# Patient Record
Sex: FEMALE | Race: WHITE | NOT HISPANIC OR LATINO | Employment: UNEMPLOYED | URBAN - NONMETROPOLITAN AREA
[De-identification: names, ages, dates, MRNs, and addresses within clinical notes are randomized per-mention and may not be internally consistent; named-entity substitution may affect disease eponyms.]

---

## 2018-12-11 ENCOUNTER — OFFICE VISIT (OUTPATIENT)
Dept: OBSTETRICS AND GYNECOLOGY | Facility: CLINIC | Age: 43
End: 2018-12-11

## 2018-12-11 VITALS
WEIGHT: 217 LBS | BODY MASS INDEX: 37.05 KG/M2 | HEIGHT: 64 IN | DIASTOLIC BLOOD PRESSURE: 86 MMHG | SYSTOLIC BLOOD PRESSURE: 144 MMHG

## 2018-12-11 DIAGNOSIS — N39.46 URINARY INCONTINENCE, MIXED: ICD-10-CM

## 2018-12-11 DIAGNOSIS — N93.0 POSTCOITAL BLEEDING: ICD-10-CM

## 2018-12-11 DIAGNOSIS — N92.1 MENOMETRORRHAGIA: ICD-10-CM

## 2018-12-11 DIAGNOSIS — N93.9 ABNORMAL UTERINE BLEEDING (AUB): Primary | ICD-10-CM

## 2018-12-11 DIAGNOSIS — Z12.4 SCREENING FOR MALIGNANT NEOPLASM OF CERVIX: ICD-10-CM

## 2018-12-11 PROCEDURE — 58100 BIOPSY OF UTERUS LINING: CPT | Performed by: OBSTETRICS & GYNECOLOGY

## 2018-12-11 PROCEDURE — 99204 OFFICE O/P NEW MOD 45 MIN: CPT | Performed by: OBSTETRICS & GYNECOLOGY

## 2018-12-11 RX ORDER — IBUPROFEN 800 MG/1
800 TABLET ORAL EVERY 6 HOURS PRN
Status: DISCONTINUED | OUTPATIENT
Start: 2018-12-11 | End: 2019-03-30 | Stop reason: HOSPADM

## 2018-12-11 RX ADMIN — IBUPROFEN 800 MG: 800 TABLET ORAL at 16:11

## 2018-12-11 NOTE — PROGRESS NOTES
Subjective   Chief Complaint   Patient presents with   • Menorrhagia     C/O bleeding heavy with clots 2-3 weeks every month and after intercourse x 3 months     Spike Lewis is a 43 y.o. year old  presenting to be seen for heavy and irregular vaginal bleeding and urinary symptoms.    She reports a long-standing history of heavy and irregular vaginal bleeding.  For many years, she experienced heavy and painful periods.  At roughly age 41, her periods stopped abruptly.  She had no vaginal bleeding for 2 years.  This year, her bleeding started again, but is now much heavier and more painful.  She denies hot flashes or night sweats over the past several years.  During several months of this year, she is experienced multiple bleeding episodes in the same month.  During the month of November, she bled almost every day.  She will frequently bleed after intercourse. She has never sought medical attention for these issues.      She has no history of hormonal medication.  She did undergo a tubal ligation after the delivery of her last child.     She reports significant urinary symptoms including urgency and incontinence.  She reports leakage of urine with both urgency symptoms and cough/sneeze/laugh.  She leaks urine most days and frequently must wear a pad.  She will typically change pads twice per day.  She frequently soils clothing with urine and blood.    Pap smear: 2005 - normal per patient report, denies history of abnormal Pap smears  Mammogram: never  Colonoscopy: never  DEXA Scan: never    She denies nausea, emesis, fevers, chills, significant weight changes, hair/skin/nail changes, dysuria, urinary frequency, palpitations, chest pain, headaches, myalgia, dyspnea.     History  Past Medical History:   Diagnosis Date   • Disease of thyroid gland    • Lupus    , Past Surgical History:   Procedure Laterality Date   •  SECTION     • CHOLECYSTECTOMY     , History reviewed. No pertinent family history.,  "Social History     Tobacco Use   • Smoking status: Current Every Day Smoker     Packs/day: 1.00     Years: 0.00     Pack years: 0.00   • Smokeless tobacco: Never Used   Substance Use Topics   • Alcohol use: Not on file   • Drug use: Not on file   ,   (Not in a hospital admission) and Allergies:  Patient has no known allergies.    No current outpatient medications on file prior to visit.     No current facility-administered medications on file prior to visit.        Social History    Tobacco Use      Smoking status: Current Every Day Smoker        Packs/day: 1.00        Years: 0.00        Pack years: 0      Smokeless tobacco: Never Used      Review of Systems  Pertinent items are noted in HPI, all other systems were reviewed and negative       Objective   /86   Ht 162.6 cm (64\")   Wt 98.4 kg (217 lb)   LMP 11/20/2018   BMI 37.25 kg/m²     Physical Exam:  General Appearance: alert, pleasant, appears stated age, interactive and cooperative  Head: normocephalic, without obvious abnormality and atraumatic  Eyes: lids and lashes normal and no icterus  Ears: ears appear intact with no abnormalities noted  Nose: nares normal, septum midline, mucosa normal and no drainage  Neck: suppple, trachea midline and no thyromegaly  Back: no kyphosis present, no scoliosis present and range of motion normal  Lungs: respirations regular, respirations even and respirations unlabored, clear to auscultation bilaterally   Heart: regular rhythm and normal rate, normal S1, S2, no murmur, gallop, or rubs and no click  Breasts: Not performed.  Abdomen: no masses, no hepatomegaly, no splenomegaly, soft non-tender, no guarding and no rebound tenderness  Extremities: moves extremities well, no edema, no cyanosis and no redness  Skin: no bleeding, bruising or rash and no lesions noted  Lymph Nodes: no palpable adenopathy  Neurologic: Cranial Nerves cranial nerves 2 - 12 grossly intact, Speech normal content and execusion, Coordination " normal  Psych: normal mood and affect, oriented to person, time and place, thought content organized and appropriate judgment    Pelvis:  Pelvic: Clinical staff was present for exam  External genitalia:  normal appearance of the external genitalia including Bartholin's and [place]'s glands.  :  urethral meatus normal;  Vagina:  normal pink mucosa without prolapse or lesions.  Cervix:  normal appearance.  Uterus:  normal size, shape and consistency.  Adnexa:  normal bimanual exam of the adnexa.  Rectal:  digital rectal exam not performed; anus visually normal appearing.    Review of Labs:   No data reviewed    Review of Imaging:  No data reviewed    Decision to obtain old records:  No.    Summarization of old records:  N/A    Independent review of image, tracing or specimen:  N/A       Assessment   1.  Abnormal uterine bleeding  2.  Menometrorrhagia  3.  Dysmenorrhea  4.  Mixed urinary incontinence  5.  Post-coital bleeding  6.  Age-appropriate health screening  7.  Tobacco abuse     Plan    Orders Placed This Encounter   Procedures   • US Pelvis Complete     Standing Status:   Future     Standing Expiration Date:   12/11/2019     Order Specific Question:   Reason for Exam:     Answer:   AUB   • Estradiol   • Follicle Stimulating Hormone   • Hemoglobin A1c   • Thyroid Panel With TSH   • Progesterone   • Luteinizing Hormone   • HCG, B-subunit, Quantitative   • Prolactin   Urodynamic testing - Mixed urinary incontinence    Medications ordered: none    Procedures performed: Pap smear, Endometrial biopsy    Endometrial Biopsy    Date of procedure: 12/11/2018    Indication:  Abnormal Uterine Bleeding             Informed Consent Obtained    Procedure documentation:    The patient was placed in the dorsal lithotomy position.  A speculum was placed in the vagina.  The cervix was prepped. The cervix was grasped anterior at the 12 o'clock position.  The cavity sounded to 9 centimeters.  An endometrial biopsy specimen was  obtained with multiple passes.  The tissue was sent for permanent histopathologic evaluation.  Tenaculum was removed from the cervix with scant bleeding.  The patient tolerated the procedure without any complications.    Post procedure instructions: She was instructed to call the office in 1 week if she has not heard from us otherwise.  If there is any significant fever, cramping, malodorous discharge, or heavy bleeding she is to call the office immediately or go to the ER.    We reviewed her symptoms in detail today.  Additonial work-up is planned as listed above.  We discussed possible treatment options focusing on the Mirena IUD, endometrial ablation and hysterectomy.  She was given handouts to further consider these options.  We discussed the possibility of a mid urethral sling and/or medical therapy for urge incontinence.    She will return for urodynamic testing and a pelvic ultrasound.    Follow up in 2 weeks after ultrasound    Clyde Guadarrama MD  Obstetrics and Gynecology  [facility]

## 2018-12-12 LAB
ESTRADIOL SERPL-MCNC: 165.8 PG/ML
FSH SERPL-ACNC: 3 MIU/ML
FT4I SERPL CALC-MCNC: 2 (ref 1.2–4.9)
HBA1C MFR BLD: 5.8 %
HCG INTACT+B SERPL-ACNC: <2.39 MIU/ML
LH SERPL-ACNC: 2.6 MIU/ML
PROGEST SERPL-MCNC: 9.2 NG/ML
PROLACTIN SERPL-MCNC: 5.7 NG/ML (ref 4.8–23.3)
T3RU NFR SERPL: 21 % (ref 24–39)
T4 SERPL-MCNC: 9.3 UG/DL (ref 4.5–12)
TSH SERPL DL<=0.005 MIU/L-ACNC: 1.48 UIU/ML (ref 0.45–4.5)

## 2018-12-18 DIAGNOSIS — N92.1 MENOMETRORRHAGIA: ICD-10-CM

## 2018-12-18 DIAGNOSIS — N93.0 POSTCOITAL BLEEDING: ICD-10-CM

## 2018-12-18 DIAGNOSIS — N93.9 ABNORMAL UTERINE BLEEDING (AUB): ICD-10-CM

## 2018-12-18 DIAGNOSIS — Z12.4 SCREENING FOR MALIGNANT NEOPLASM OF CERVIX: ICD-10-CM

## 2019-01-11 ENCOUNTER — PREP FOR SURGERY (OUTPATIENT)
Dept: OTHER | Facility: HOSPITAL | Age: 44
End: 2019-01-11

## 2019-01-11 ENCOUNTER — CLINICAL SUPPORT (OUTPATIENT)
Dept: OBSTETRICS AND GYNECOLOGY | Facility: CLINIC | Age: 44
End: 2019-01-11

## 2019-01-11 ENCOUNTER — APPOINTMENT (OUTPATIENT)
Dept: PREADMISSION TESTING | Facility: HOSPITAL | Age: 44
End: 2019-01-11

## 2019-01-11 ENCOUNTER — OFFICE VISIT (OUTPATIENT)
Dept: OBSTETRICS AND GYNECOLOGY | Facility: CLINIC | Age: 44
End: 2019-01-11

## 2019-01-11 ENCOUNTER — HOSPITAL ENCOUNTER (OUTPATIENT)
Dept: GENERAL RADIOLOGY | Facility: HOSPITAL | Age: 44
Discharge: HOME | End: 2019-01-11
Admitting: OBSTETRICS & GYNECOLOGY

## 2019-01-11 VITALS
DIASTOLIC BLOOD PRESSURE: 86 MMHG | SYSTOLIC BLOOD PRESSURE: 144 MMHG | BODY MASS INDEX: 37.05 KG/M2 | WEIGHT: 217 LBS | HEIGHT: 64 IN

## 2019-01-11 VITALS
SYSTOLIC BLOOD PRESSURE: 136 MMHG | BODY MASS INDEX: 36.7 KG/M2 | WEIGHT: 215 LBS | HEIGHT: 64 IN | DIASTOLIC BLOOD PRESSURE: 74 MMHG

## 2019-01-11 VITALS
BODY MASS INDEX: 36.7 KG/M2 | HEART RATE: 82 BPM | SYSTOLIC BLOOD PRESSURE: 163 MMHG | HEIGHT: 64 IN | DIASTOLIC BLOOD PRESSURE: 89 MMHG | WEIGHT: 215 LBS

## 2019-01-11 DIAGNOSIS — N93.9 ABNORMAL UTERINE BLEEDING (AUB): Primary | ICD-10-CM

## 2019-01-11 DIAGNOSIS — N39.3 STRESS INCONTINENCE IN FEMALE: ICD-10-CM

## 2019-01-11 DIAGNOSIS — E66.9 OBESITY (BMI 30-39.9): ICD-10-CM

## 2019-01-11 DIAGNOSIS — N93.9 ABNORMAL UTERINE BLEEDING (AUB): ICD-10-CM

## 2019-01-11 DIAGNOSIS — N92.1 MENOMETRORRHAGIA: ICD-10-CM

## 2019-01-11 DIAGNOSIS — F17.200 SMOKER: ICD-10-CM

## 2019-01-11 DIAGNOSIS — R73.03 PREDIABETES: ICD-10-CM

## 2019-01-11 DIAGNOSIS — N39.46 MIXED STRESS AND URGE URINARY INCONTINENCE: Primary | ICD-10-CM

## 2019-01-11 LAB
ABO GROUP BLD: NORMAL
ANION GAP SERPL CALCULATED.3IONS-SCNC: 12.9 MMOL/L (ref 10–20)
BASOPHILS # BLD AUTO: 0.05 10*3/MM3 (ref 0–0.2)
BASOPHILS NFR BLD AUTO: 0.6 % (ref 0–2.5)
BILIRUB UR QL STRIP: NEGATIVE
BUN BLD-MCNC: 10 MG/DL (ref 7–20)
BUN/CREAT SERPL: 14.3 (ref 7.1–23.5)
CALCIUM SPEC-SCNC: 9.7 MG/DL (ref 8.4–10.2)
CHLORIDE SERPL-SCNC: 105 MMOL/L (ref 98–107)
CLARITY UR: CLEAR
CO2 SERPL-SCNC: 23 MMOL/L (ref 26–30)
COLOR UR: YELLOW
CREAT BLD-MCNC: 0.7 MG/DL (ref 0.6–1.3)
DEPRECATED RDW RBC AUTO: 46.9 FL (ref 37–54)
EOSINOPHIL # BLD AUTO: 0.16 10*3/MM3 (ref 0–0.7)
EOSINOPHIL NFR BLD AUTO: 1.8 % (ref 0–7)
ERYTHROCYTE [DISTWIDTH] IN BLOOD BY AUTOMATED COUNT: 14.6 % (ref 11.5–14.5)
GFR SERPL CREATININE-BSD FRML MDRD: 91 ML/MIN/1.73
GLUCOSE BLD-MCNC: 88 MG/DL (ref 74–98)
GLUCOSE UR STRIP-MCNC: NEGATIVE MG/DL
HCT VFR BLD AUTO: 40.4 % (ref 37–47)
HGB BLD-MCNC: 13.7 G/DL (ref 12–16)
HGB UR QL STRIP.AUTO: NEGATIVE
IMM GRANULOCYTES # BLD AUTO: 0.02 10*3/MM3 (ref 0–0.06)
IMM GRANULOCYTES NFR BLD AUTO: 0.2 % (ref 0–0.6)
KETONES UR QL STRIP: NEGATIVE
LEUKOCYTE ESTERASE UR QL STRIP.AUTO: NEGATIVE
LYMPHOCYTES # BLD AUTO: 3.57 10*3/MM3 (ref 0.6–3.4)
LYMPHOCYTES NFR BLD AUTO: 41.1 % (ref 10–50)
MCH RBC QN AUTO: 29.6 PG (ref 27–31)
MCHC RBC AUTO-ENTMCNC: 33.9 G/DL (ref 30–37)
MCV RBC AUTO: 87.3 FL (ref 81–99)
MONOCYTES # BLD AUTO: 0.46 10*3/MM3 (ref 0–0.9)
MONOCYTES NFR BLD AUTO: 5.3 % (ref 0–12)
NEUTROPHILS # BLD AUTO: 4.43 10*3/MM3 (ref 2–6.9)
NEUTROPHILS NFR BLD AUTO: 51 % (ref 37–80)
NITRITE UR QL STRIP: NEGATIVE
NRBC BLD AUTO-RTO: 0 /100 WBC (ref 0–0)
PH UR STRIP.AUTO: 5.5 [PH] (ref 5–8)
PLATELET # BLD AUTO: 340 10*3/MM3 (ref 130–400)
PMV BLD AUTO: 9.9 FL (ref 6–12)
POTASSIUM BLD-SCNC: 3.9 MMOL/L (ref 3.5–5.1)
PROT UR QL STRIP: NEGATIVE
RBC # BLD AUTO: 4.63 10*6/MM3 (ref 4.2–5.4)
RH BLD: POSITIVE
SODIUM BLD-SCNC: 137 MMOL/L (ref 137–145)
SP GR UR STRIP: 1.02 (ref 1–1.03)
UROBILINOGEN UR QL STRIP: NORMAL
WBC NRBC COR # BLD: 8.69 10*3/MM3 (ref 4.8–10.8)

## 2019-01-11 PROCEDURE — 81003 URINALYSIS AUTO W/O SCOPE: CPT | Performed by: OBSTETRICS & GYNECOLOGY

## 2019-01-11 PROCEDURE — 51729 CYSTOMETROGRAM W/VP&UP: CPT | Performed by: OBSTETRICS & GYNECOLOGY

## 2019-01-11 PROCEDURE — 80048 BASIC METABOLIC PNL TOTAL CA: CPT | Performed by: OBSTETRICS & GYNECOLOGY

## 2019-01-11 PROCEDURE — 93005 ELECTROCARDIOGRAM TRACING: CPT | Performed by: OBSTETRICS & GYNECOLOGY

## 2019-01-11 PROCEDURE — 36415 COLL VENOUS BLD VENIPUNCTURE: CPT

## 2019-01-11 PROCEDURE — 51741 ELECTRO-UROFLOWMETRY FIRST: CPT | Performed by: OBSTETRICS & GYNECOLOGY

## 2019-01-11 PROCEDURE — 71045 X-RAY EXAM CHEST 1 VIEW: CPT

## 2019-01-11 PROCEDURE — 86900 BLOOD TYPING SEROLOGIC ABO: CPT | Performed by: OBSTETRICS & GYNECOLOGY

## 2019-01-11 PROCEDURE — 99214 OFFICE O/P EST MOD 30 MIN: CPT | Performed by: OBSTETRICS & GYNECOLOGY

## 2019-01-11 PROCEDURE — 86901 BLOOD TYPING SEROLOGIC RH(D): CPT | Performed by: OBSTETRICS & GYNECOLOGY

## 2019-01-11 PROCEDURE — 51797 INTRAABDOMINAL PRESSURE TEST: CPT | Performed by: OBSTETRICS & GYNECOLOGY

## 2019-01-11 PROCEDURE — 85025 COMPLETE CBC W/AUTO DIFF WBC: CPT | Performed by: OBSTETRICS & GYNECOLOGY

## 2019-01-11 RX ORDER — SULFAMETHOXAZOLE AND TRIMETHOPRIM 800; 160 MG/1; MG/1
TABLET ORAL
COMMUNITY
Start: 2018-12-04 | End: 2019-01-11

## 2019-01-11 RX ORDER — SODIUM CHLORIDE 0.9 % (FLUSH) 0.9 %
3 SYRINGE (ML) INJECTION EVERY 12 HOURS SCHEDULED
Status: CANCELLED | OUTPATIENT
Start: 2019-03-29

## 2019-01-11 RX ORDER — CEFAZOLIN SODIUM 2 G/50ML
2 SOLUTION INTRAVENOUS
Status: CANCELLED | OUTPATIENT
Start: 2019-01-11

## 2019-01-11 RX ORDER — PHENAZOPYRIDINE HYDROCHLORIDE 100 MG/1
200 TABLET, FILM COATED ORAL ONCE
Status: CANCELLED | OUTPATIENT
Start: 2019-03-29 | End: 2019-01-11

## 2019-01-11 RX ORDER — CEPHALEXIN 500 MG/1
CAPSULE ORAL
COMMUNITY
Start: 2019-01-07 | End: 2019-01-11

## 2019-01-11 RX ORDER — IBUPROFEN 800 MG/1
TABLET ORAL
COMMUNITY
Start: 2018-12-04 | End: 2019-01-11

## 2019-01-11 RX ORDER — SODIUM CHLORIDE 0.9 % (FLUSH) 0.9 %
1-10 SYRINGE (ML) INJECTION AS NEEDED
Status: CANCELLED | OUTPATIENT
Start: 2019-03-29

## 2019-01-11 RX ORDER — RANITIDINE 300 MG/1
300 TABLET ORAL 2 TIMES DAILY
COMMUNITY
Start: 2019-01-07

## 2019-01-11 NOTE — PROGRESS NOTES
Subjective   Chief Complaint   Patient presents with   • Follow-up     Patient had Urodynamics and TVS done today for AUB     Spike Lewis is a 43 y.o. year old  presenting to be seen for follow-up AUB and urinary incontinence.    Patient was last seen on  where she reported a long-standing history of heavy and irregular vaginal bleeding.  For many years, she experienced heavy and painful periods.  At roughly age 41, her periods stopped abruptly.  She had no vaginal bleeding for 2 years. In 2018, her bleeding started again, but is now much heavier and more painful.  She denies hot flashes or night sweats over the past several years.  During several months of last year, she experienced multiple bleeding episodes in the same month.  During the month of November, she bled almost every day.  She will frequently bleed after intercourse.       She has no history of hormonal medication.  She did undergo a tubal ligation after the delivery of her last child.  She has undergone 2 previous C-sections.    Her blood work from her last visit was reassuring except for an elevated A1c consistent with prediabetes.  Her Pap smear and endometrial biopsy from that visit were both benign.  She underwent a pelvic ultrasound today.     At her visit on  she also reported significant urinary symptoms including urgency and incontinence.  She reports leakage of urine with both urgency symptoms and cough/sneeze/laugh.  She leaks urine most days and frequently must wear a pad.  She will typically change pads twice per day.  She frequently soils clothing with urine and blood.  She confirms today that her stress symptoms are more bothersome than the urgency symptoms.    She denies nausea, emesis, fevers, chills, significant weight changes, hair/skin/nail changes, palpitations, chest pain, headaches, myalgia, dyspnea.     History  Past Medical History:   Diagnosis Date   • Body piercing     BOTH EARS   • Disease of thyroid  "gland    • Full dentures    • GERD (gastroesophageal reflux disease)    • Stress incontinence    • Wears glasses    , Past Surgical History:   Procedure Laterality Date   •  SECTION      X2   • CHOLECYSTECTOMY     • MOUTH SURGERY      FULL EXTRACTION   , No family history on file., Social History     Tobacco Use   • Smoking status: Current Every Day Smoker     Packs/day: 1.00     Years: 26.00     Pack years: 26.00   • Smokeless tobacco: Never Used   Substance Use Topics   • Alcohol use: No     Frequency: Never   • Drug use: Yes     Types: Methamphetamines     Comment: 22 MONTHS SOBER   ,   (Not in a hospital admission) and Allergies:  Penicillins    Current Outpatient Medications on File Prior to Visit   Medication Sig Dispense Refill   • raNITIdine (ZANTAC) 300 MG tablet Take 300 mg by mouth 2 (Two) Times a Day.     • [DISCONTINUED] cephalexin (KEFLEX) 500 MG capsule      • [DISCONTINUED] ibuprofen (ADVIL,MOTRIN) 800 MG tablet      • [DISCONTINUED] mupirocin (BACTROBAN) 2 % ointment      • [DISCONTINUED] sulfamethoxazole-trimethoprim (BACTRIM DS,SEPTRA DS) 800-160 MG per tablet        Current Facility-Administered Medications on File Prior to Visit   Medication Dose Route Frequency Provider Last Rate Last Dose   • ibuprofen (ADVIL,MOTRIN) tablet 800 mg  800 mg Oral Q6H PRN Clyde Guadarrama MD   800 mg at 18 1611       Social History    Tobacco Use      Smoking status: Current Every Day Smoker        Packs/day: 1.00        Years: 26.00        Pack years: 26      Smokeless tobacco: Never Used      Review of Systems  Pertinent items are noted in HPI, all other systems were reviewed and negative       Objective   /74   Ht 162.6 cm (64\")   Wt 97.5 kg (215 lb)   LMP 2018   BMI 36.90 kg/m²     Physical Exam:  General Appearance: alert, pleasant, appears stated age, interactive and cooperative  Head: normocephalic, without obvious abnormality and atraumatic  Eyes: lids and lashes normal " and no icterus  Ears: ears appear intact with no abnormalities noted  Nose: nares normal, septum midline, mucosa normal and no drainage  Neck: suppple, trachea midline and no thyromegaly  Back: no kyphosis present, no scoliosis present and range of motion normal  Lungs: respirations regular, respirations even and respirations unlabored, clear to auscultation bilaterally   Heart: regular rhythm and normal rate, normal S1, S2, no murmur, gallop, or rubs and no click  Breasts: Not performed.  Abdomen: no masses, no hepatomegaly, no splenomegaly, soft non-tender, no guarding and no rebound tenderness  Extremities: moves extremities well, no edema, no cyanosis and no redness  Skin: no bleeding, bruising or rash and no lesions noted  Lymph Nodes: no palpable adenopathy  Neurologic: Cranial Nerves cranial nerves 2 - 12 grossly intact, Speech normal content and execusion, Coordination normal  Psych: normal mood and affect, oriented to person, time and place, thought content organized and appropriate judgment    Review of Labs:   All lab work from her visit on 12/11  Pap smear and EMB with benign results 12/11    Review of Imaging:  No data reviewed    Decision to obtain old records:  No.    Summarization of old records:  N/A    Independent review of image, tracing or specimen:  A pelvic ultrasound was ordered and independently reviewed today. Normal sized, anteverted uterus with multiple small exophytic fibroids, the largest of which measures 1.5 cm.  Endometrium measures 9 mm.  Both ovaries are normal in appearance with normal vascularity.  No adnexal masses.  No free fluid in the cul-de-sac.    Her urodynamic testing today confirms urodynamic stress incontinence. No evidence of intrinsic sphincter deficiency.       Assessment   Abnormal uterine bleeding  Menometrorrhagia  Dysmenorrhea  Postcoital bleeding  Mixed urinary incontinence with stress predominance and urodynamic stress incontinence, no ISD  Tobacco  abuse  Obesity - BMI 37  Prediabetes - A1C 5.8%     Plan    We reviewed her symptoms, labs, previous workup, ultrasound findings and urodynamic testing in detail today.  Her bleeding and pain symptoms are debilitating and affect daily activities.  She does soil clothing with her bleeding.  We reviewed treatment options including the Mirena IUD, other medical therapies, endometrial ablation and hysterectomy.  The patient desires definitive treatment and would like to move forward with hysterectomy.  We discussed possible surgical routes reviewing risks and benefits of each.  We will plan for a total laparoscopic hysterectomy with bilateral salpingectomy.    Her urinary symptoms are likewise very bothersome to the patient and she does demonstrate genuine stress incontinence on urodynamics today.  We reviewed treatment options with mid urethral sling placement being the gold standard.  The patient would like concomitant sling placement at the time of hysterectomy. We will plan for a TOT sling + Cysto.  We discussed the risks of slings including, specifically, worsening urgency symptoms.  She would likely benefit from anti-cholinergic therapy moving forward.    We discussed smoking cessation and diet modification.  I reviewed the implications of prediabetes with the patient today.    Clyde Guadarrama MD  Obstetrics and Gynecology  [facility]

## 2019-01-11 NOTE — PAT
CALLED TRI MI CRNA R/T CXR ORDERED PER ANESTHESIA ORDER SET.  PT C/O BRONCHITIS  ABOUT A MONTH AGO.  STATES THAT SHE WAS TREATED WITH TWO DIFFERENT ANTIBIOTICS, IN WHICH SHE HAS COMPLETED.  PT DENIES EVER HAVING A CHEST X-RAY.  PT STATES THAT SHE STILL HAS A DRY COUGH.  DENIES ANY FEVER OR NASAL CONGESTION.

## 2019-01-15 PROBLEM — F17.200 SMOKER: Status: ACTIVE | Noted: 2019-01-15

## 2019-01-15 PROBLEM — E66.9 OBESITY (BMI 30-39.9): Status: ACTIVE | Noted: 2019-01-15

## 2019-01-15 PROBLEM — R73.03 PREDIABETES: Status: ACTIVE | Noted: 2019-01-15

## 2019-01-31 ENCOUNTER — ANESTHESIA EVENT (OUTPATIENT)
Dept: PERIOP | Facility: HOSPITAL | Age: 44
End: 2019-01-31

## 2019-01-31 ENCOUNTER — ANESTHESIA (OUTPATIENT)
Dept: PERIOP | Facility: HOSPITAL | Age: 44
End: 2019-01-31

## 2019-03-15 ENCOUNTER — APPOINTMENT (OUTPATIENT)
Dept: PREADMISSION TESTING | Facility: HOSPITAL | Age: 44
End: 2019-03-15

## 2019-03-22 ENCOUNTER — HOSPITAL ENCOUNTER (OUTPATIENT)
Dept: GENERAL RADIOLOGY | Facility: HOSPITAL | Age: 44
Discharge: HOME | End: 2019-03-22
Admitting: OBSTETRICS & GYNECOLOGY

## 2019-03-22 ENCOUNTER — APPOINTMENT (OUTPATIENT)
Dept: PREADMISSION TESTING | Facility: HOSPITAL | Age: 44
End: 2019-03-22

## 2019-03-22 VITALS — BODY MASS INDEX: 37.22 KG/M2 | HEIGHT: 64 IN | WEIGHT: 218 LBS

## 2019-03-22 LAB
BILIRUB UR QL STRIP: NEGATIVE
CLARITY UR: CLEAR
COLOR UR: YELLOW
DEPRECATED RDW RBC AUTO: 44.3 FL (ref 37–54)
ERYTHROCYTE [DISTWIDTH] IN BLOOD BY AUTOMATED COUNT: 13.7 % (ref 11.5–14.5)
GLUCOSE UR STRIP-MCNC: NEGATIVE MG/DL
HCT VFR BLD AUTO: 44.4 % (ref 37–47)
HGB BLD-MCNC: 14.7 G/DL (ref 12–16)
HGB UR QL STRIP.AUTO: NEGATIVE
KETONES UR QL STRIP: NEGATIVE
LEUKOCYTE ESTERASE UR QL STRIP.AUTO: NEGATIVE
MCH RBC QN AUTO: 29.5 PG (ref 27–31)
MCHC RBC AUTO-ENTMCNC: 33.1 G/DL (ref 30–37)
MCV RBC AUTO: 89 FL (ref 81–99)
NITRITE UR QL STRIP: NEGATIVE
PH UR STRIP.AUTO: 7 [PH] (ref 5–8)
PLATELET # BLD AUTO: 386 10*3/MM3 (ref 130–400)
PMV BLD AUTO: 9.7 FL (ref 6–12)
PROT UR QL STRIP: NEGATIVE
RBC # BLD AUTO: 4.99 10*6/MM3 (ref 4.2–5.4)
SP GR UR STRIP: 1.01 (ref 1–1.03)
UROBILINOGEN UR QL STRIP: NORMAL
WBC NRBC COR # BLD: 12.13 10*3/MM3 (ref 4.8–10.8)

## 2019-03-22 PROCEDURE — 36415 COLL VENOUS BLD VENIPUNCTURE: CPT

## 2019-03-22 PROCEDURE — 85027 COMPLETE CBC AUTOMATED: CPT | Performed by: OBSTETRICS & GYNECOLOGY

## 2019-03-22 PROCEDURE — 71045 X-RAY EXAM CHEST 1 VIEW: CPT

## 2019-03-22 PROCEDURE — 81003 URINALYSIS AUTO W/O SCOPE: CPT | Performed by: OBSTETRICS & GYNECOLOGY

## 2019-03-22 NOTE — DISCHARGE INSTRUCTIONS
Patient instructed on PAT PASS information.  Patient/family member verbalized understanding of instruction/education.Patient to apply Chlorhexadine wipes  to surgical area (as instructed) the night before procedure and the AM of procedure. Wipes provided.Chlorhexidine wipes given to patient with verification sheet. Patient/Family member verbalized understanding to all teaching and instruction.GYNECOLOGICAL PREP:    1). Soap Suds Enema (as instructed) the night before surgery.    2). Purchase two medicated douches. Use one douche the night before the procedure     and the other on the morning of your surgery.    Schedule:    On______, the evening before the procedure, do the following:    Soap Suds Enema  1 Medicated Douche  Shower  Use 1 package of chlorhexidine wipes, 1 hour after the shower.      On______, the morning of the procedure, do the followin Medicated Douche  Use 1 package of chlorhexidine wipes.PAT PASS GIVEN/REVIEWED WITH PT.  VERBALIZED UNDERSTANDING OF THE FOLLOWING:  DO NOT EAT, DRINK, SMOKE, USE SMOKELESS TOBACCO OR CHEW GUM AFTER MIDNIGHT THE NIGHT BEFORE SURGERY.  THIS ALSO INCLUDES HARD CANDIES AND MINTS.    DO NOT SHAVE THE AREA TO BE OPERATED ON AT LEAST 48 HOURS PRIOR TO THE PROCEDURE.  DO NOT WEAR MAKE UP OR NAIL POLISH.  DO NOT LEAVE IN ANY PIERCING OR WEAR JEWELRY THE DAY OF SURGERY.      DO NOT USE ADHESIVES IF YOU WEAR DENTURES.    DO NOT WEAR EYE CONTACTS; BRING IN YOUR GLASSES.    ONLY TAKE MEDICATION THE MORNING OF YOUR PROCEDURE IF INSTRUCTED BY YOUR SURGEON WITH ENOUGH WATER TO SWALLOW THE MEDICATION.  IF YOUR SURGEON DID NOT SPECIFY WHICH MEDICATIONS TO TAKE, YOU WILL NEED TO CALL THEIR OFFICE FOR FURTHER INSTRUCTIONS AND DO AS THEY INSTRUCT.    LEAVE ANYTHING YOU CONSIDER VALUABLE AT HOME.    YOU WILL NEED TO ARRANGE FOR SOMEONE TO DRIVE YOU HOME AFTER SURGERY.  IT IS RECOMMENDED THAT YOU DO NOT DRIVE, WORK, DRINK ALCOHOL OR MAKE MAJOR DECISIONS FOR AT LEAST 24 HOURS AFTER  YOUR PROCEDURE IS COMPLETE.      THE DAY OF YOUR PROCEDURE, BRING IN THE FOLLOWING IF APPLICABLE:   PICTURE ID AND INSURANCE/MEDICARE OR MEDICAID CARDS   COPY OF ADVANCED DIRECTIVE/LIVING WILL/POWER OR    CPAP/BIPAP/INHALERS   SKIN PREP SHEET   YOUR PREADMISSION TESTING PASS (IF NOT A PHONE HISTORY)        PAT PASS GIVEN/REVIEWED WITH PT.  VERBALIZED UNDERSTANDING OF THE FOLLOWING:  DO NOT EAT, DRINK, SMOKE, USE SMOKELESS TOBACCO OR CHEW GUM AFTER MIDNIGHT THE NIGHT BEFORE SURGERY.  THIS ALSO INCLUDES HARD CANDIES AND MINTS.    DO NOT SHAVE THE AREA TO BE OPERATED ON AT LEAST 48 HOURS PRIOR TO THE PROCEDURE.  DO NOT WEAR MAKE UP OR NAIL POLISH.  DO NOT LEAVE IN ANY PIERCING OR WEAR JEWELRY THE DAY OF SURGERY.      DO NOT USE ADHESIVES IF YOU WEAR DENTURES.    DO NOT WEAR EYE CONTACTS; BRING IN YOUR GLASSES.    ONLY TAKE MEDICATION THE MORNING OF YOUR PROCEDURE IF INSTRUCTED BY YOUR SURGEON WITH ENOUGH WATER TO SWALLOW THE MEDICATION.  IF YOUR SURGEON DID NOT SPECIFY WHICH MEDICATIONS TO TAKE, YOU WILL NEED TO CALL THEIR OFFICE FOR FURTHER INSTRUCTIONS AND DO AS THEY INSTRUCT.    LEAVE ANYTHING YOU CONSIDER VALUABLE AT HOME.    YOU WILL NEED TO ARRANGE FOR SOMEONE TO DRIVE YOU HOME AFTER SURGERY.  IT IS RECOMMENDED THAT YOU DO NOT DRIVE, WORK, DRINK ALCOHOL OR MAKE MAJOR DECISIONS FOR AT LEAST 24 HOURS AFTER YOUR PROCEDURE IS COMPLETE.      THE DAY OF YOUR PROCEDURE, BRING IN THE FOLLOWING IF APPLICABLE:   PICTURE ID AND INSURANCE/MEDICARE OR MEDICAID CARDS   COPY OF ADVANCED DIRECTIVE/LIVING WILL/POWER OR    CPAP/BIPAP/INHALERS   SKIN PREP SHEET   YOUR PREADMISSION TESTING PASS (IF NOT A PHONE HISTORY)

## 2019-03-29 ENCOUNTER — HOSPITAL ENCOUNTER (OUTPATIENT)
Facility: HOSPITAL | Age: 44
Discharge: HOME | End: 2019-03-30
Attending: OBSTETRICS & GYNECOLOGY | Admitting: OBSTETRICS & GYNECOLOGY

## 2019-03-29 DIAGNOSIS — N92.1 MENOMETRORRHAGIA: ICD-10-CM

## 2019-03-29 DIAGNOSIS — N93.9 ABNORMAL UTERINE BLEEDING (AUB): ICD-10-CM

## 2019-03-29 DIAGNOSIS — N39.3 STRESS INCONTINENCE IN FEMALE: ICD-10-CM

## 2019-03-29 LAB
ABO GROUP BLD: NORMAL
B-HCG UR QL: NEGATIVE
BLD GP AB SCN SERPL QL: NEGATIVE
INTERNAL NEGATIVE CONTROL: NEGATIVE
INTERNAL POSITIVE CONTROL: POSITIVE
Lab: NORMAL
RH BLD: POSITIVE
T&S EXPIRATION DATE: NORMAL

## 2019-03-29 PROCEDURE — 86900 BLOOD TYPING SEROLOGIC ABO: CPT | Performed by: OBSTETRICS & GYNECOLOGY

## 2019-03-29 PROCEDURE — 25010000002 ONDANSETRON PER 1 MG: Performed by: NURSE ANESTHETIST, CERTIFIED REGISTERED

## 2019-03-29 PROCEDURE — 57288 REPAIR BLADDER DEFECT: CPT | Performed by: OBSTETRICS & GYNECOLOGY

## 2019-03-29 PROCEDURE — 25010000002 KETOROLAC TROMETHAMINE PER 15 MG: Performed by: OBSTETRICS & GYNECOLOGY

## 2019-03-29 PROCEDURE — 25010000002 KETOROLAC TROMETHAMINE PER 15 MG

## 2019-03-29 PROCEDURE — G0378 HOSPITAL OBSERVATION PER HR: HCPCS

## 2019-03-29 PROCEDURE — 58800 DRAINAGE OF OVARIAN CYST(S): CPT | Performed by: OBSTETRICS & GYNECOLOGY

## 2019-03-29 PROCEDURE — 86901 BLOOD TYPING SEROLOGIC RH(D): CPT | Performed by: OBSTETRICS & GYNECOLOGY

## 2019-03-29 PROCEDURE — 25010000002 GENTAMICIN PER 80 MG: Performed by: OBSTETRICS & GYNECOLOGY

## 2019-03-29 PROCEDURE — 25010000002 HYDROMORPHONE PER 4 MG: Performed by: NURSE ANESTHETIST, CERTIFIED REGISTERED

## 2019-03-29 PROCEDURE — 86850 RBC ANTIBODY SCREEN: CPT | Performed by: OBSTETRICS & GYNECOLOGY

## 2019-03-29 PROCEDURE — 25010000002 FENTANYL CITRATE (PF) 100 MCG/2ML SOLUTION: Performed by: NURSE ANESTHETIST, CERTIFIED REGISTERED

## 2019-03-29 PROCEDURE — C1771 REP DEV, URINARY, W/SLING: HCPCS | Performed by: OBSTETRICS & GYNECOLOGY

## 2019-03-29 PROCEDURE — S0260 H&P FOR SURGERY: HCPCS | Performed by: OBSTETRICS & GYNECOLOGY

## 2019-03-29 PROCEDURE — 25010000002 PROPOFOL 200 MG/20ML EMULSION: Performed by: NURSE ANESTHETIST, CERTIFIED REGISTERED

## 2019-03-29 PROCEDURE — 58571 TLH W/T/O 250 G OR LESS: CPT | Performed by: OBSTETRICS & GYNECOLOGY

## 2019-03-29 PROCEDURE — 81025 URINE PREGNANCY TEST: CPT | Performed by: OBSTETRICS & GYNECOLOGY

## 2019-03-29 PROCEDURE — 25010000002 DEXAMETHASONE PER 1 MG: Performed by: NURSE ANESTHETIST, CERTIFIED REGISTERED

## 2019-03-29 DEVICE — ABSORBABLE RELOAD
Type: IMPLANTABLE DEVICE | Site: ABDOMEN | Status: FUNCTIONAL
Brand: V-LOC 180

## 2019-03-29 DEVICE — TRANSOBTURATOR SLING SYSTEM WITH PRECISIONBLUE™ DESIGN
Type: IMPLANTABLE DEVICE | Site: BLADDER | Status: FUNCTIONAL
Brand: OBTRYX™ II SYSTEM - HALO

## 2019-03-29 RX ORDER — HYDROMORPHONE HCL 110MG/55ML
PATIENT CONTROLLED ANALGESIA SYRINGE INTRAVENOUS AS NEEDED
Status: DISCONTINUED | OUTPATIENT
Start: 2019-03-29 | End: 2019-03-29 | Stop reason: SURG

## 2019-03-29 RX ORDER — DOCUSATE SODIUM 100 MG/1
100 CAPSULE, LIQUID FILLED ORAL 2 TIMES DAILY
Status: DISCONTINUED | OUTPATIENT
Start: 2019-03-29 | End: 2019-03-29

## 2019-03-29 RX ORDER — NALOXONE HCL 0.4 MG/ML
0.1 VIAL (ML) INJECTION
Status: DISCONTINUED | OUTPATIENT
Start: 2019-03-29 | End: 2019-03-30 | Stop reason: HOSPADM

## 2019-03-29 RX ORDER — ACETAMINOPHEN 500 MG
1000 TABLET ORAL EVERY 8 HOURS
Status: DISCONTINUED | OUTPATIENT
Start: 2019-03-29 | End: 2019-03-30 | Stop reason: HOSPADM

## 2019-03-29 RX ORDER — PROMETHAZINE HYDROCHLORIDE 25 MG/1
25 SUPPOSITORY RECTAL ONCE AS NEEDED
Status: DISCONTINUED | OUTPATIENT
Start: 2019-03-29 | End: 2019-03-29 | Stop reason: HOSPADM

## 2019-03-29 RX ORDER — LORAZEPAM 2 MG/ML
0.5 INJECTION INTRAMUSCULAR ONCE
Status: DISCONTINUED | OUTPATIENT
Start: 2019-03-29 | End: 2019-03-29 | Stop reason: HOSPADM

## 2019-03-29 RX ORDER — LIDOCAINE HYDROCHLORIDE 10 MG/ML
INJECTION, SOLUTION INFILTRATION; PERINEURAL AS NEEDED
Status: DISCONTINUED | OUTPATIENT
Start: 2019-03-29 | End: 2019-03-29 | Stop reason: HOSPADM

## 2019-03-29 RX ORDER — PROMETHAZINE HYDROCHLORIDE 25 MG/ML
12.5 INJECTION, SOLUTION INTRAMUSCULAR; INTRAVENOUS EVERY 6 HOURS PRN
Status: DISCONTINUED | OUTPATIENT
Start: 2019-03-29 | End: 2019-03-30 | Stop reason: HOSPADM

## 2019-03-29 RX ORDER — ONDANSETRON 2 MG/ML
INJECTION INTRAMUSCULAR; INTRAVENOUS AS NEEDED
Status: DISCONTINUED | OUTPATIENT
Start: 2019-03-29 | End: 2019-03-29 | Stop reason: SURG

## 2019-03-29 RX ORDER — GLYCOPYRROLATE 0.2 MG/ML
INJECTION INTRAMUSCULAR; INTRAVENOUS AS NEEDED
Status: DISCONTINUED | OUTPATIENT
Start: 2019-03-29 | End: 2019-03-29 | Stop reason: SURG

## 2019-03-29 RX ORDER — SIMETHICONE 80 MG
80 TABLET,CHEWABLE ORAL 4 TIMES DAILY PRN
Status: DISCONTINUED | OUTPATIENT
Start: 2019-03-29 | End: 2019-03-30 | Stop reason: HOSPADM

## 2019-03-29 RX ORDER — OXYCODONE HYDROCHLORIDE 5 MG/1
5 TABLET ORAL EVERY 4 HOURS PRN
Status: DISCONTINUED | OUTPATIENT
Start: 2019-03-29 | End: 2019-03-30 | Stop reason: HOSPADM

## 2019-03-29 RX ORDER — ONDANSETRON 2 MG/ML
4 INJECTION INTRAMUSCULAR; INTRAVENOUS EVERY 6 HOURS PRN
Status: DISCONTINUED | OUTPATIENT
Start: 2019-03-29 | End: 2019-03-30 | Stop reason: HOSPADM

## 2019-03-29 RX ORDER — KETOROLAC TROMETHAMINE 30 MG/ML
30 INJECTION, SOLUTION INTRAMUSCULAR; INTRAVENOUS ONCE
Status: DISCONTINUED | OUTPATIENT
Start: 2019-03-29 | End: 2019-03-30 | Stop reason: HOSPADM

## 2019-03-29 RX ORDER — MAGNESIUM HYDROXIDE 1200 MG/15ML
LIQUID ORAL AS NEEDED
Status: DISCONTINUED | OUTPATIENT
Start: 2019-03-29 | End: 2019-03-29 | Stop reason: HOSPADM

## 2019-03-29 RX ORDER — PHENAZOPYRIDINE HYDROCHLORIDE 100 MG/1
200 TABLET, FILM COATED ORAL ONCE
Status: COMPLETED | OUTPATIENT
Start: 2019-03-29 | End: 2019-03-29

## 2019-03-29 RX ORDER — PROMETHAZINE HYDROCHLORIDE 12.5 MG/1
12.5 TABLET ORAL EVERY 6 HOURS PRN
Status: DISCONTINUED | OUTPATIENT
Start: 2019-03-29 | End: 2019-03-30 | Stop reason: HOSPADM

## 2019-03-29 RX ORDER — DEXTROSE AND SODIUM CHLORIDE 5; .45 G/100ML; G/100ML
75 INJECTION, SOLUTION INTRAVENOUS CONTINUOUS
Status: DISCONTINUED | OUTPATIENT
Start: 2019-03-29 | End: 2019-03-30 | Stop reason: HOSPADM

## 2019-03-29 RX ORDER — PROMETHAZINE HYDROCHLORIDE 25 MG/ML
6.25 INJECTION, SOLUTION INTRAMUSCULAR; INTRAVENOUS ONCE AS NEEDED
Status: DISCONTINUED | OUTPATIENT
Start: 2019-03-29 | End: 2019-03-29 | Stop reason: HOSPADM

## 2019-03-29 RX ORDER — SODIUM CHLORIDE, SODIUM LACTATE, POTASSIUM CHLORIDE, CALCIUM CHLORIDE 600; 310; 30; 20 MG/100ML; MG/100ML; MG/100ML; MG/100ML
1000 INJECTION, SOLUTION INTRAVENOUS CONTINUOUS
Status: DISCONTINUED | OUTPATIENT
Start: 2019-03-29 | End: 2019-03-29 | Stop reason: HOSPADM

## 2019-03-29 RX ORDER — PROPOFOL 10 MG/ML
INJECTION, EMULSION INTRAVENOUS AS NEEDED
Status: DISCONTINUED | OUTPATIENT
Start: 2019-03-29 | End: 2019-03-29 | Stop reason: SURG

## 2019-03-29 RX ORDER — SODIUM CHLORIDE 0.9 % (FLUSH) 0.9 %
3 SYRINGE (ML) INJECTION EVERY 12 HOURS SCHEDULED
Status: DISCONTINUED | OUTPATIENT
Start: 2019-03-29 | End: 2019-03-29 | Stop reason: HOSPADM

## 2019-03-29 RX ORDER — FAMOTIDINE 20 MG/1
20 TABLET, FILM COATED ORAL 2 TIMES DAILY PRN
Status: DISCONTINUED | OUTPATIENT
Start: 2019-03-29 | End: 2019-03-30 | Stop reason: HOSPADM

## 2019-03-29 RX ORDER — NEOSTIGMINE METHYLSULFATE 5 MG/5 ML
SYRINGE (ML) INTRAVENOUS AS NEEDED
Status: DISCONTINUED | OUTPATIENT
Start: 2019-03-29 | End: 2019-03-29 | Stop reason: SURG

## 2019-03-29 RX ORDER — PROMETHAZINE HYDROCHLORIDE 25 MG/1
25 TABLET ORAL ONCE AS NEEDED
Status: DISCONTINUED | OUTPATIENT
Start: 2019-03-29 | End: 2019-03-29 | Stop reason: HOSPADM

## 2019-03-29 RX ORDER — ONDANSETRON 4 MG/1
4 TABLET, ORALLY DISINTEGRATING ORAL EVERY 6 HOURS PRN
Status: DISCONTINUED | OUTPATIENT
Start: 2019-03-29 | End: 2019-03-30 | Stop reason: HOSPADM

## 2019-03-29 RX ORDER — DOCUSATE SODIUM 100 MG/1
100 CAPSULE, LIQUID FILLED ORAL 2 TIMES DAILY PRN
Status: DISCONTINUED | OUTPATIENT
Start: 2019-03-29 | End: 2019-03-30 | Stop reason: HOSPADM

## 2019-03-29 RX ORDER — ONDANSETRON 4 MG/1
4 TABLET, FILM COATED ORAL EVERY 6 HOURS PRN
Status: DISCONTINUED | OUTPATIENT
Start: 2019-03-29 | End: 2019-03-30 | Stop reason: HOSPADM

## 2019-03-29 RX ORDER — SODIUM CHLORIDE, SODIUM LACTATE, POTASSIUM CHLORIDE, CALCIUM CHLORIDE 600; 310; 30; 20 MG/100ML; MG/100ML; MG/100ML; MG/100ML
1000 INJECTION, SOLUTION INTRAVENOUS CONTINUOUS
Status: DISCONTINUED | OUTPATIENT
Start: 2019-03-29 | End: 2019-03-29

## 2019-03-29 RX ORDER — FAMOTIDINE 20 MG/1
20 TABLET, FILM COATED ORAL 2 TIMES DAILY
Status: DISCONTINUED | OUTPATIENT
Start: 2019-03-29 | End: 2019-03-29

## 2019-03-29 RX ORDER — BUPIVACAINE HYDROCHLORIDE 2.5 MG/ML
INJECTION, SOLUTION EPIDURAL; INFILTRATION; INTRACAUDAL AS NEEDED
Status: DISCONTINUED | OUTPATIENT
Start: 2019-03-29 | End: 2019-03-29 | Stop reason: SURG

## 2019-03-29 RX ORDER — ROCURONIUM BROMIDE 10 MG/ML
INJECTION, SOLUTION INTRAVENOUS AS NEEDED
Status: DISCONTINUED | OUTPATIENT
Start: 2019-03-29 | End: 2019-03-29 | Stop reason: SURG

## 2019-03-29 RX ORDER — KETOROLAC TROMETHAMINE 30 MG/ML
30 INJECTION, SOLUTION INTRAMUSCULAR; INTRAVENOUS EVERY 6 HOURS
Status: DISCONTINUED | OUTPATIENT
Start: 2019-03-29 | End: 2019-03-30 | Stop reason: HOSPADM

## 2019-03-29 RX ORDER — MEPERIDINE HYDROCHLORIDE 50 MG/ML
12.5 INJECTION INTRAMUSCULAR; INTRAVENOUS; SUBCUTANEOUS
Status: DISCONTINUED | OUTPATIENT
Start: 2019-03-29 | End: 2019-03-29 | Stop reason: HOSPADM

## 2019-03-29 RX ORDER — FENTANYL CITRATE 50 UG/ML
INJECTION, SOLUTION INTRAMUSCULAR; INTRAVENOUS AS NEEDED
Status: DISCONTINUED | OUTPATIENT
Start: 2019-03-29 | End: 2019-03-29 | Stop reason: SURG

## 2019-03-29 RX ORDER — KETOROLAC TROMETHAMINE 30 MG/ML
INJECTION, SOLUTION INTRAMUSCULAR; INTRAVENOUS
Status: COMPLETED
Start: 2019-03-29 | End: 2019-03-29

## 2019-03-29 RX ORDER — ZOLPIDEM TARTRATE 5 MG/1
5 TABLET ORAL NIGHTLY PRN
Status: DISCONTINUED | OUTPATIENT
Start: 2019-03-29 | End: 2019-03-30 | Stop reason: HOSPADM

## 2019-03-29 RX ORDER — NICOTINE 21 MG/24HR
1 PATCH, TRANSDERMAL 24 HOURS TRANSDERMAL
Status: DISCONTINUED | OUTPATIENT
Start: 2019-03-29 | End: 2019-03-30 | Stop reason: HOSPADM

## 2019-03-29 RX ORDER — OXYCODONE HYDROCHLORIDE 5 MG/1
10 TABLET ORAL EVERY 4 HOURS PRN
Status: DISCONTINUED | OUTPATIENT
Start: 2019-03-29 | End: 2019-03-30 | Stop reason: HOSPADM

## 2019-03-29 RX ORDER — ONDANSETRON 2 MG/ML
4 INJECTION INTRAMUSCULAR; INTRAVENOUS ONCE AS NEEDED
Status: DISCONTINUED | OUTPATIENT
Start: 2019-03-29 | End: 2019-03-29 | Stop reason: HOSPADM

## 2019-03-29 RX ORDER — CALCIUM CARBONATE 200(500)MG
1 TABLET,CHEWABLE ORAL 3 TIMES DAILY PRN
Status: DISCONTINUED | OUTPATIENT
Start: 2019-03-29 | End: 2019-03-30 | Stop reason: HOSPADM

## 2019-03-29 RX ORDER — DEXAMETHASONE SODIUM PHOSPHATE 4 MG/ML
INJECTION, SOLUTION INTRA-ARTICULAR; INTRALESIONAL; INTRAMUSCULAR; INTRAVENOUS; SOFT TISSUE AS NEEDED
Status: DISCONTINUED | OUTPATIENT
Start: 2019-03-29 | End: 2019-03-29 | Stop reason: SURG

## 2019-03-29 RX ORDER — ALUMINA, MAGNESIA, AND SIMETHICONE 2400; 2400; 240 MG/30ML; MG/30ML; MG/30ML
15 SUSPENSION ORAL EVERY 6 HOURS
Status: DISCONTINUED | OUTPATIENT
Start: 2019-03-29 | End: 2019-03-29

## 2019-03-29 RX ORDER — CLINDAMYCIN PHOSPHATE 900 MG/50ML
900 INJECTION, SOLUTION INTRAVENOUS ONCE
Status: COMPLETED | OUTPATIENT
Start: 2019-03-29 | End: 2019-03-29

## 2019-03-29 RX ORDER — GENTAMICIN SULFATE 80 MG/100ML
1.5 INJECTION, SOLUTION INTRAVENOUS ONCE
Status: COMPLETED | OUTPATIENT
Start: 2019-03-29 | End: 2019-03-29

## 2019-03-29 RX ORDER — ALBUTEROL SULFATE 2.5 MG/3ML
2.5 SOLUTION RESPIRATORY (INHALATION) ONCE AS NEEDED
Status: DISCONTINUED | OUTPATIENT
Start: 2019-03-29 | End: 2019-03-29 | Stop reason: HOSPADM

## 2019-03-29 RX ORDER — SODIUM CHLORIDE 0.9 % (FLUSH) 0.9 %
1-10 SYRINGE (ML) INJECTION AS NEEDED
Status: DISCONTINUED | OUTPATIENT
Start: 2019-03-29 | End: 2019-03-29 | Stop reason: HOSPADM

## 2019-03-29 RX ORDER — PROMETHAZINE HYDROCHLORIDE 12.5 MG/1
12.5 SUPPOSITORY RECTAL EVERY 6 HOURS PRN
Status: DISCONTINUED | OUTPATIENT
Start: 2019-03-29 | End: 2019-03-30 | Stop reason: HOSPADM

## 2019-03-29 RX ORDER — KETAMINE HCL IN NACL, ISO-OSM 100MG/10ML
SYRINGE (ML) INJECTION AS NEEDED
Status: DISCONTINUED | OUTPATIENT
Start: 2019-03-29 | End: 2019-03-29 | Stop reason: SURG

## 2019-03-29 RX ADMIN — Medication 10 MG: at 10:22

## 2019-03-29 RX ADMIN — ROCURONIUM BROMIDE 5 MG: 10 INJECTION INTRAVENOUS at 10:00

## 2019-03-29 RX ADMIN — ACETAMINOPHEN 1000 MG: 500 TABLET, FILM COATED ORAL at 14:08

## 2019-03-29 RX ADMIN — KETOROLAC TROMETHAMINE 30 MG: 30 INJECTION, SOLUTION INTRAMUSCULAR; INTRAVENOUS at 10:53

## 2019-03-29 RX ADMIN — PHENAZOPYRIDINE HYDROCHLORIDE 200 MG: 100 TABLET ORAL at 06:36

## 2019-03-29 RX ADMIN — ACETAMINOPHEN 1000 MG: 500 TABLET, FILM COATED ORAL at 20:32

## 2019-03-29 RX ADMIN — GLYCOPYRROLATE 0.5 MG: 0.2 INJECTION, SOLUTION INTRAMUSCULAR; INTRAVENOUS at 10:10

## 2019-03-29 RX ADMIN — HYDROMORPHONE HYDROCHLORIDE 0.2 MG: 2 INJECTION, SOLUTION INTRAMUSCULAR; INTRAVENOUS; SUBCUTANEOUS at 10:22

## 2019-03-29 RX ADMIN — Medication 3.5 MG: at 10:10

## 2019-03-29 RX ADMIN — ROCURONIUM BROMIDE 15 MG: 10 INJECTION INTRAVENOUS at 09:08

## 2019-03-29 RX ADMIN — DEXAMETHASONE SODIUM PHOSPHATE 10 MG: 4 INJECTION, SOLUTION INTRAMUSCULAR; INTRAVENOUS at 07:46

## 2019-03-29 RX ADMIN — Medication 25 MG: at 07:55

## 2019-03-29 RX ADMIN — DEXTROSE AND SODIUM CHLORIDE 75 ML/HR: 5; 450 INJECTION, SOLUTION INTRAVENOUS at 11:20

## 2019-03-29 RX ADMIN — NICOTINE 1 PATCH: 14 PATCH TRANSDERMAL at 20:33

## 2019-03-29 RX ADMIN — ONDANSETRON 4 MG: 2 INJECTION INTRAMUSCULAR; INTRAVENOUS at 07:37

## 2019-03-29 RX ADMIN — SIMETHICONE CHEW TAB 80 MG 80 MG: 80 TABLET ORAL at 20:32

## 2019-03-29 RX ADMIN — HYDROMORPHONE HYDROCHLORIDE 0.5 MG: 2 INJECTION, SOLUTION INTRAMUSCULAR; INTRAVENOUS; SUBCUTANEOUS at 08:40

## 2019-03-29 RX ADMIN — ROCURONIUM BROMIDE 10 MG: 10 INJECTION INTRAVENOUS at 08:39

## 2019-03-29 RX ADMIN — HYDROMORPHONE HYDROCHLORIDE 0.3 MG: 2 INJECTION, SOLUTION INTRAMUSCULAR; INTRAVENOUS; SUBCUTANEOUS at 10:18

## 2019-03-29 RX ADMIN — CLINDAMYCIN PHOSPHATE 900 MG: 900 INJECTION, SOLUTION INTRAVENOUS at 07:25

## 2019-03-29 RX ADMIN — BUPIVACAINE HYDROCHLORIDE 30 ML: 2.5 INJECTION, SOLUTION EPIDURAL; INFILTRATION; INTRACAUDAL; PERINEURAL at 10:31

## 2019-03-29 RX ADMIN — HYDROMORPHONE HYDROCHLORIDE 0.5 MG: 2 INJECTION, SOLUTION INTRAMUSCULAR; INTRAVENOUS; SUBCUTANEOUS at 09:46

## 2019-03-29 RX ADMIN — PROPOFOL 150 MG: 10 INJECTION, EMULSION INTRAVENOUS at 07:34

## 2019-03-29 RX ADMIN — LIDOCAINE HYDROCHLORIDE 20 MG: 20 INJECTION, SOLUTION INTRAVENOUS at 07:43

## 2019-03-29 RX ADMIN — GENTAMICIN SULFATE 80 MG: 80 INJECTION, SOLUTION INTRAVENOUS at 07:48

## 2019-03-29 RX ADMIN — PROPOFOL 50 MG: 10 INJECTION, EMULSION INTRAVENOUS at 07:43

## 2019-03-29 RX ADMIN — SODIUM CHLORIDE, POTASSIUM CHLORIDE, SODIUM LACTATE AND CALCIUM CHLORIDE 1000 ML: 600; 310; 30; 20 INJECTION, SOLUTION INTRAVENOUS at 06:36

## 2019-03-29 RX ADMIN — BUPIVACAINE HYDROCHLORIDE 30 ML: 2.5 INJECTION, SOLUTION EPIDURAL; INFILTRATION; INTRACAUDAL; PERINEURAL at 10:25

## 2019-03-29 RX ADMIN — HYDROMORPHONE HYDROCHLORIDE 0.5 MG: 2 INJECTION, SOLUTION INTRAMUSCULAR; INTRAVENOUS; SUBCUTANEOUS at 07:43

## 2019-03-29 RX ADMIN — ROCURONIUM BROMIDE 40 MG: 10 INJECTION INTRAVENOUS at 07:34

## 2019-03-29 RX ADMIN — KETOROLAC TROMETHAMINE 30 MG: 30 INJECTION, SOLUTION INTRAMUSCULAR; INTRAVENOUS at 23:26

## 2019-03-29 RX ADMIN — FENTANYL CITRATE 100 MCG: 50 INJECTION, SOLUTION INTRAMUSCULAR; INTRAVENOUS at 07:34

## 2019-03-29 RX ADMIN — KETOROLAC TROMETHAMINE 30 MG: 30 INJECTION, SOLUTION INTRAMUSCULAR; INTRAVENOUS at 16:54

## 2019-03-29 RX ADMIN — LIDOCAINE HYDROCHLORIDE 80 MG: 20 INJECTION, SOLUTION INTRAVENOUS at 07:34

## 2019-03-29 RX ADMIN — SODIUM CHLORIDE, POTASSIUM CHLORIDE, SODIUM LACTATE AND CALCIUM CHLORIDE: 600; 310; 30; 20 INJECTION, SOLUTION INTRAVENOUS at 09:15

## 2019-03-29 NOTE — ANESTHESIA PROCEDURE NOTES
Peripheral Block      Patient reassessed immediately prior to procedure    Patient location during procedure: OR  Start time: 3/29/2019 10:22 AM  Stop time: 3/29/2019 10:37 AM  Reason for block: at surgeon's request  Performed by  CRNA: Alex Crump CRNA  Preanesthetic Checklist  Completed: patient identified, site marked, surgical consent, pre-op evaluation, timeout performed, IV checked, risks and benefits discussed and monitors and equipment checked  Prep:  Pt Position: supine  Sterile barriers:cap, gloves, mask and sterile barriers  Prep: air dry 3 min per clock  Patient monitoring: blood pressure monitoring, continuous pulse oximetry and EKG  Procedure  Sedation:no  Performed under: general  Guidance:ultrasound guided  ULTRASOUND INTERPRETATION. Using ultrasound guidance a 21 G gauge needle was placed in close proximity to the nerve, at which point, under ultrasound guidance anesthetic was injected in the area of the nerve and spread of the anesthesia was seen on ultrasound in close proximity thereto.  There were no abnormalities seen on ultrasound; a digital image was taken; and the patient tolerated the procedure with no complications. Images:still images obtained    Laterality:Bilateral  Block Type:TAP  Injection Technique:single-shot  Needle Type:echogenic  Needle Gauge:21 G  Resistance on Injection: none    Medications  Comment:Marcaine 0.25% 30 ml per side, total volume 30 ml    Post Assessment  Injection Assessment: negative aspiration for heme, no paresthesia on injection and incremental injection  Patient Tolerance:comfortable throughout block

## 2019-03-29 NOTE — ANESTHESIA POSTPROCEDURE EVALUATION
Patient: Spike Lewis    Procedure Summary     Date:  03/29/19 Room / Location:  [facility] OR 2 /  [facility]    Anesthesia Start:  0725 Anesthesia Stop:  1042    Procedure:  TOTAL LAPAROSCOPIC HYSTERECTOMY, BILATERAL SALPINGECTOMY, DRAINAGE OF LEFT OVARIAN CYST, MID-URETHRAL SLING (TOT), CYSTOSCOPY (N/A Abdomen) Diagnosis:       Abnormal uterine bleeding (AUB)      Menometrorrhagia      Stress incontinence in female      (Abnormal uterine bleeding (AUB) [N93.9])      (Menometrorrhagia [N92.1])      (Stress incontinence in female [N39.3])    Surgeon:  Clyde Guadarrama MD Provider:  Erich Ling CRNA    Anesthesia Type:  general ASA Status:  2          Anesthesia Type: general  Last vitals  BP   163/86 @ 1043 3/29/2019   Temp 98.4   Pulse 76   Resp 20   SpO2 100%     Post Anesthesia Care and Evaluation    Patient location during evaluation: PACU  Patient participation: complete - patient participated  Level of consciousness: awake and sleepy but conscious  Pain management: adequate  Airway patency: patent  Anesthetic complications: No anesthetic complications  PONV Status: none  Cardiovascular status: acceptable  Respiratory status: acceptable, face mask, spontaneous ventilation and nonlabored ventilation  Hydration status: acceptable    `

## 2019-03-29 NOTE — ANESTHESIA PROCEDURE NOTES
Airway  Urgency: elective    Airway not difficult    General Information and Staff    Patient location during procedure: OR  CRNA: Erich Ling CRNA    Indications and Patient Condition  Indications for airway management: airway protection    Preoxygenated: yes  MILS maintained throughout  Mask difficulty assessment: 1 - vent by mask    Final Airway Details  Final airway type: endotracheal airway      Successful airway: ETT  Cuffed: yes   Successful intubation technique: direct laryngoscopy  Facilitating devices/methods: intubating stylet  Endotracheal tube insertion site: oral  Blade: Mickey  Blade size: 3  ETT size (mm): 7.5  Cormack-Lehane Classification: grade I - full view of glottis  Placement verified by: chest auscultation and capnometry   Cuff volume (mL): 6  Measured from: teeth  ETT to teeth (cm): 21  Number of attempts at approach: 1    Additional Comments  Airway placed without problems. Dentition and lips as noted on pre-induction. ETT cuff inflated to minimal occlusive pressure. ETT secured.

## 2019-03-30 VITALS
DIASTOLIC BLOOD PRESSURE: 66 MMHG | OXYGEN SATURATION: 96 % | RESPIRATION RATE: 20 BRPM | TEMPERATURE: 97.6 F | SYSTOLIC BLOOD PRESSURE: 122 MMHG | HEART RATE: 74 BPM

## 2019-03-30 PROBLEM — N93.9 ABNORMAL UTERINE BLEEDING (AUB): Status: RESOLVED | Noted: 2019-01-11 | Resolved: 2019-03-30

## 2019-03-30 PROBLEM — Z90.710 S/P HYSTERECTOMY: Status: ACTIVE | Noted: 2019-03-30

## 2019-03-30 PROBLEM — N92.1 MENOMETRORRHAGIA: Status: RESOLVED | Noted: 2019-01-11 | Resolved: 2019-03-30

## 2019-03-30 PROBLEM — Z96.0 HISTORY OF PUBOVAGINAL SLING: Status: ACTIVE | Noted: 2019-03-30

## 2019-03-30 PROBLEM — N39.3 STRESS INCONTINENCE IN FEMALE: Status: RESOLVED | Noted: 2019-01-11 | Resolved: 2019-03-30

## 2019-03-30 LAB
ANION GAP SERPL CALCULATED.3IONS-SCNC: 12 MMOL/L (ref 10–20)
BASOPHILS # BLD AUTO: 0.02 10*3/MM3 (ref 0–0.2)
BASOPHILS NFR BLD AUTO: 0.1 % (ref 0–1.5)
BUN BLD-MCNC: 10 MG/DL (ref 7–20)
BUN/CREAT SERPL: 16.7 (ref 7.1–23.5)
CALCIUM SPEC-SCNC: 9.2 MG/DL (ref 8.4–10.2)
CHLORIDE SERPL-SCNC: 105 MMOL/L (ref 98–107)
CO2 SERPL-SCNC: 25 MMOL/L (ref 26–30)
CREAT BLD-MCNC: 0.6 MG/DL (ref 0.6–1.3)
DEPRECATED RDW RBC AUTO: 45.8 FL (ref 37–54)
EOSINOPHIL # BLD AUTO: 0.01 10*3/MM3 (ref 0–0.4)
EOSINOPHIL NFR BLD AUTO: 0.1 % (ref 0.3–6.2)
ERYTHROCYTE [DISTWIDTH] IN BLOOD BY AUTOMATED COUNT: 13.7 % (ref 12.3–15.4)
GFR SERPL CREATININE-BSD FRML MDRD: 109 ML/MIN/1.73
GLUCOSE BLD-MCNC: 173 MG/DL (ref 74–98)
HCT VFR BLD AUTO: 37.5 % (ref 34–46.6)
HGB BLD-MCNC: 12.3 G/DL (ref 12–15.9)
IMM GRANULOCYTES # BLD AUTO: 0.09 10*3/MM3 (ref 0–0.05)
IMM GRANULOCYTES NFR BLD AUTO: 0.5 % (ref 0–0.5)
LYMPHOCYTES # BLD AUTO: 2.48 10*3/MM3 (ref 0.7–3.1)
LYMPHOCYTES NFR BLD AUTO: 15.1 % (ref 19.6–45.3)
MCH RBC QN AUTO: 29.9 PG (ref 26.6–33)
MCHC RBC AUTO-ENTMCNC: 32.8 G/DL (ref 31.5–35.7)
MCV RBC AUTO: 91 FL (ref 79–97)
MONOCYTES # BLD AUTO: 0.86 10*3/MM3 (ref 0.1–0.9)
MONOCYTES NFR BLD AUTO: 5.2 % (ref 5–12)
NEUTROPHILS # BLD AUTO: 12.95 10*3/MM3 (ref 1.4–7)
NEUTROPHILS NFR BLD AUTO: 79 % (ref 42.7–76)
NRBC BLD AUTO-RTO: 0 /100 WBC (ref 0–0)
PLATELET # BLD AUTO: 319 10*3/MM3 (ref 140–450)
PMV BLD AUTO: 9.3 FL (ref 6–12)
POTASSIUM BLD-SCNC: 4 MMOL/L (ref 3.5–5.1)
RBC # BLD AUTO: 4.12 10*6/MM3 (ref 3.77–5.28)
SODIUM BLD-SCNC: 138 MMOL/L (ref 137–145)
WBC NRBC COR # BLD: 16.41 10*3/MM3 (ref 3.4–10.8)

## 2019-03-30 PROCEDURE — 51703 INSERT BLADDER CATH COMPLEX: CPT

## 2019-03-30 PROCEDURE — G0378 HOSPITAL OBSERVATION PER HR: HCPCS

## 2019-03-30 PROCEDURE — 80048 BASIC METABOLIC PNL TOTAL CA: CPT | Performed by: OBSTETRICS & GYNECOLOGY

## 2019-03-30 PROCEDURE — 25010000002 KETOROLAC TROMETHAMINE PER 15 MG: Performed by: OBSTETRICS & GYNECOLOGY

## 2019-03-30 PROCEDURE — 85025 COMPLETE CBC W/AUTO DIFF WBC: CPT | Performed by: OBSTETRICS & GYNECOLOGY

## 2019-03-30 PROCEDURE — 99024 POSTOP FOLLOW-UP VISIT: CPT | Performed by: OBSTETRICS & GYNECOLOGY

## 2019-03-30 RX ORDER — IBUPROFEN 800 MG/1
800 TABLET ORAL EVERY 8 HOURS PRN
Qty: 30 TABLET | Refills: 0 | Status: SHIPPED | OUTPATIENT
Start: 2019-03-30

## 2019-03-30 RX ORDER — ACETAMINOPHEN 500 MG
1000 TABLET ORAL EVERY 8 HOURS PRN
Qty: 60 TABLET | Refills: 0 | Status: SHIPPED | OUTPATIENT
Start: 2019-03-30 | End: 2020-03-29

## 2019-03-30 RX ORDER — DOCUSATE SODIUM 100 MG/1
100 CAPSULE, LIQUID FILLED ORAL 2 TIMES DAILY
Qty: 60 CAPSULE | Refills: 12 | Status: SHIPPED | OUTPATIENT
Start: 2019-03-30 | End: 2020-03-29

## 2019-03-30 RX ORDER — OXYBUTYNIN CHLORIDE 5 MG/1
5 TABLET, EXTENDED RELEASE ORAL DAILY
Qty: 30 TABLET | Refills: 12 | Status: SHIPPED | OUTPATIENT
Start: 2019-03-30 | End: 2020-03-29

## 2019-03-30 RX ORDER — OXYCODONE HYDROCHLORIDE 5 MG/1
5 TABLET ORAL EVERY 4 HOURS PRN
Qty: 10 TABLET | Refills: 0 | Status: SHIPPED | OUTPATIENT
Start: 2019-03-30 | End: 2019-04-16

## 2019-03-30 RX ADMIN — KETOROLAC TROMETHAMINE 30 MG: 30 INJECTION, SOLUTION INTRAMUSCULAR; INTRAVENOUS at 06:31

## 2019-03-30 RX ADMIN — ACETAMINOPHEN 1000 MG: 500 TABLET, FILM COATED ORAL at 06:25

## 2019-04-04 LAB
LAB AP CASE REPORT: NORMAL
PATH REPORT.FINAL DX SPEC: NORMAL

## 2019-04-16 ENCOUNTER — OFFICE VISIT (OUTPATIENT)
Dept: OBSTETRICS AND GYNECOLOGY | Facility: CLINIC | Age: 44
End: 2019-04-16

## 2019-04-16 VITALS
BODY MASS INDEX: 38.58 KG/M2 | WEIGHT: 226 LBS | SYSTOLIC BLOOD PRESSURE: 122 MMHG | HEIGHT: 64 IN | DIASTOLIC BLOOD PRESSURE: 68 MMHG

## 2019-04-16 DIAGNOSIS — Z98.890 HISTORY OF SUBURETHRAL SLING PROCEDURE: ICD-10-CM

## 2019-04-16 DIAGNOSIS — Z90.710 S/P HYSTERECTOMY: Primary | ICD-10-CM

## 2019-04-16 PROCEDURE — 99024 POSTOP FOLLOW-UP VISIT: CPT | Performed by: OBSTETRICS & GYNECOLOGY

## 2019-04-16 NOTE — PROGRESS NOTES
"Chief Complaint   Patient presents with   • Post-op     2 Weeks PO Total Laparoscopic Hysterectomy, Bilateral Salpingectomy, Drainage of left ovarian cyst, Placement of mid-urethral sling, Cystoscopy. No complaints       43 y.o.  female who presents for a post-op visit.    Pre-op Diagnosis:  Abnormal uterine bleeding (AUB) [N93.9]  Menometrorrhagia [N92.1]  Stress incontinence in female [N39.3]   Post-op Diagnosis:  Same  Left ovarian cyst, simple   Procedure: Total laparoscopic hysterectomy  Bilateral salpingectomy  Drainage of left ovarian cyst  Placement of TOT mid-urethral sling (Obtryx II)  Cystoscopy      Pt reports that pain is well controlled, she is eating/drinking/stooling/voiding without issues. She is ambulating well. She denies vaginal bleeding.  She is voiding normally and emptying her bladder well.  She denies urinary leakage.  She has no complaints.    Vitals:    19 1124   BP: 122/68   Weight: 103 kg (226 lb)   Height: 162.6 cm (64\")       PHYSICAL EXAM   General appearance: well nourished, well hydrated, no acute distress  Abdomen: soft, non distended, non-tender, no masses, incision(s) well-healing, no signs of infection/separation  Mental Status Exam:   · Judgment, insight: intact  · Orientation: oriented to time, place, and person  · Memory: intact for recent and remote events  · Mood and affect: no depression, anxiety, or agitation    IMPRESSION/PLAN:    TLH + TOT sling  Post-op evaluation    - Pt meeting all post-op milestones  - Pathology reviewed - benign findings    RTC for full post-op assessment in 4 weeks    Clyde Guadarrama MD  Obstetrics and Gynecology  [facility]  "

## 2019-04-20 PROBLEM — Z98.890 HISTORY OF SUBURETHRAL SLING PROCEDURE: Status: ACTIVE | Noted: 2019-03-30

## 2019-04-28 ENCOUNTER — HOSPITAL ENCOUNTER (EMERGENCY)
Facility: HOSPITAL | Age: 44
Discharge: HOME | End: 2019-04-28
Attending: EMERGENCY MEDICINE | Admitting: EMERGENCY MEDICINE

## 2019-04-28 VITALS
WEIGHT: 226.6 LBS | SYSTOLIC BLOOD PRESSURE: 162 MMHG | HEIGHT: 64 IN | DIASTOLIC BLOOD PRESSURE: 98 MMHG | OXYGEN SATURATION: 100 % | RESPIRATION RATE: 18 BRPM | TEMPERATURE: 97.2 F | HEART RATE: 85 BPM | BODY MASS INDEX: 38.68 KG/M2

## 2019-04-28 DIAGNOSIS — L02.214 GROIN ABSCESS: Primary | ICD-10-CM

## 2019-04-28 PROCEDURE — 99282 EMERGENCY DEPT VISIT SF MDM: CPT

## 2019-04-28 RX ORDER — SULFAMETHOXAZOLE AND TRIMETHOPRIM 800; 160 MG/1; MG/1
1 TABLET ORAL 2 TIMES DAILY
Qty: 20 TABLET | Refills: 0 | Status: SHIPPED | OUTPATIENT
Start: 2019-04-28

## 2021-02-10 ENCOUNTER — TRANSCRIBE ORDERS (OUTPATIENT)
Dept: ADMINISTRATIVE | Facility: HOSPITAL | Age: 46
End: 2021-02-10

## 2021-02-10 DIAGNOSIS — Z12.31 VISIT FOR SCREENING MAMMOGRAM: Primary | ICD-10-CM

## (undated) DEVICE — 2, DISPOSABLE SUCTION/IRRIGATOR WITHOUT DISPOSABLE TIP: Brand: STRYKEFLOW

## (undated) DEVICE — RICH LAVH: Brand: MEDLINE INDUSTRIES, INC.

## (undated) DEVICE — ENDOPATH XCEL BLADELESS TROCARS WITH STABILITY SLEEVES: Brand: ENDOPATH XCEL

## (undated) DEVICE — SUT MNCRYL PLS ANTIB UD 3/0 PS2 27IN

## (undated) DEVICE — FLTR PLUMEPORT LAP W/CONN STRL

## (undated) DEVICE — STRIP,CLOSURE,WOUND,MEDI-STRIP,1/2X4: Brand: MEDLINE

## (undated) DEVICE — SUT VIC 3/0 SH 27IN J416H

## (undated) DEVICE — SUT VIC 0 CT 36IN J958H

## (undated) DEVICE — GLV SURG SENSICARE ALOE LF PF SZ7.5 GRN

## (undated) DEVICE — VAGINAL PACKING: Brand: DEROYAL

## (undated) DEVICE — SPNG GZ WOVN 4X4IN 12PLY 10/BX STRL

## (undated) DEVICE — JELLY,LUBE,STERILE,FLIP TOP,TUBE,2-OZ: Brand: MEDLINE

## (undated) DEVICE — ST IRR CYSTO W/SPK 77IN LF

## (undated) DEVICE — SUTURING DEVICE: Brand: ENDO STITCH

## (undated) DEVICE — ENDOPATH XCEL UNIVERSAL TROCAR STABLILITY SLEEVES: Brand: ENDOPATH XCEL

## (undated) DEVICE — GLV SURG BIOGEL PI ULTRATOUCH G SZ7.5 LF

## (undated) DEVICE — HARMONIC ACE +7 LAPAROSCOPIC SHEARS ADVANCED HEMOSTASIS 5MM DIAMETER 36CM SHAFT LENGTH  FOR USE WITH GRAY HAND PIECE ONLY: Brand: HARMONIC ACE

## (undated) DEVICE — CUFF SCD HEMOFORCE SEQ CALF STD MD

## (undated) DEVICE — SKIN AFFIX SURG ADHESIVE 72/CS 0.55ML: Brand: MEDLINE

## (undated) DEVICE — GOWN,SIRUS,NON REINFRCD,LARGE,SET IN SL: Brand: MEDLINE

## (undated) DEVICE — SYR LUERLOK 50ML

## (undated) DEVICE — MANIP UTER RUMI TP 6.7MM 10CM GRN

## (undated) DEVICE — MANIP UTER ARCH KOHEFFICIENT 3.0CM

## (undated) DEVICE — MARKR SKIN W/RULR

## (undated) DEVICE — 3M™ STERI-STRIP™ REINFORCED ADHESIVE SKIN CLOSURES, R1547, 1/2 IN X 4 IN (12 MM X 100 MM), 6 STRIPS/ENVELOPE: Brand: 3M™ STERI-STRIP™

## (undated) DEVICE — ADHS LIQ MASTISOL 2/3ML

## (undated) DEVICE — 4-PORT MANIFOLD: Brand: NEPTUNE 2

## (undated) DEVICE — PAD STEEP TRENDELENBURG W/RAIL STRAP INTEGR ARM PROTECT WING